# Patient Record
Sex: MALE | Race: AMERICAN INDIAN OR ALASKA NATIVE
[De-identification: names, ages, dates, MRNs, and addresses within clinical notes are randomized per-mention and may not be internally consistent; named-entity substitution may affect disease eponyms.]

---

## 2020-01-09 ENCOUNTER — HOSPITAL ENCOUNTER (OUTPATIENT)
Dept: HOSPITAL 5 - OR | Age: 65
Discharge: HOME | End: 2020-01-09
Attending: UROLOGY
Payer: OTHER GOVERNMENT

## 2020-01-09 VITALS — DIASTOLIC BLOOD PRESSURE: 78 MMHG | SYSTOLIC BLOOD PRESSURE: 118 MMHG

## 2020-01-09 DIAGNOSIS — Z79.899: ICD-10-CM

## 2020-01-09 DIAGNOSIS — Z98.890: ICD-10-CM

## 2020-01-09 DIAGNOSIS — N20.0: Primary | ICD-10-CM

## 2020-01-09 DIAGNOSIS — Z72.89: ICD-10-CM

## 2020-01-09 DIAGNOSIS — Z88.8: ICD-10-CM

## 2020-01-09 DIAGNOSIS — G47.30: ICD-10-CM

## 2020-01-09 DIAGNOSIS — Z87.891: ICD-10-CM

## 2020-01-09 DIAGNOSIS — I10: ICD-10-CM

## 2020-01-09 PROCEDURE — 82962 GLUCOSE BLOOD TEST: CPT

## 2020-01-09 PROCEDURE — 50590 FRAGMENTING OF KIDNEY STONE: CPT

## 2020-01-09 RX ADMIN — MIDAZOLAM NR MG: 1 INJECTION INTRAMUSCULAR; INTRAVENOUS at 07:30

## 2020-01-09 RX ADMIN — SODIUM CHLORIDE, SODIUM LACTATE, POTASSIUM CHLORIDE, AND CALCIUM CHLORIDE SCH MLS/HR: .6; .31; .03; .02 INJECTION, SOLUTION INTRAVENOUS at 07:29

## 2020-01-09 NOTE — OPERATIVE REPORT
PREOPERATIVE DIAGNOSES:  Left renal stones.



POSTOPERATIVE DIAGNOSES:  Left renal stones.



PROCEDURE:  In situ lithotripsy.



SURGEON:  Dr. Ford.



ANESTHESIA:  General.



FINDINGS:  This is a gentleman with a prominent stone, left kidney, intermittent

discomfort.  He now presents for left lithotripsy.  All the risks and

implications have been discussed.



DESCRIPTION OF PROCEDURE:  The patient was brought to lithotripsy unit and

placed on the operating table.  Following induction of anesthesia, the stone was

easily localized in both the AP and oblique image.  Shocks were begun at 1 kV,

increased to a maximum of 8 kV.  The stone did spread out.  There was already a

fragment to start with and this was an oblique stone.  The patient tolerated the

procedure well.  He needs to followup.  I explained this to his family.  His

wife was here and he brought to recovery in stable condition.





DD: 01/09/2020 08:41

DT: 01/09/2020 08:45

JOB# [id number]  [id number]

ALEXANDR/OPAL

## 2020-01-09 NOTE — POST OPERATIVE NOTE
Date of procedure: 01/09/20


Pre-op diagnosis: L renal stones


Post-op diagnosis: same


Findings: 





as above 


Procedure: 





L eswl 


Anesthesia: ELIO


Surgeon: SEMAJ DELUCA


Estimated blood loss: none


Pathology: none


Condition: stable


Disposition: PACU

## 2020-01-09 NOTE — DISCHARGE SUMMARY
Short Stay Discharge Plan


Activity: other (no straining )


Weight Bearing Status: Full Weight Bearing


Diet: low fat, low cholesterol, low salt


Special Instructions: other (inc fluids )


Follow up with: 


[facility] [Primary Care Provider] - 7 Days


GIBSON FERRELL MD [Staff Physician] - 14 Days

## 2020-01-09 NOTE — ANESTHESIA CONSULTATION
Anesthesia Consult and Med Hx


Date of service: 01/09/20





- Airway


Anesthetic Teeth Evaluation: Good


ROM Head & Neck: Adequate


Mental/Hyoid Distance: Adequate


Mallampati Class: Class II


Intubation Access Assessment: Good





- Pulmonary Exam


CTA: Yes





- Cardiac Exam


Cardiac Exam: RRR





- Pre-Operative Health Status


ASA Pre-Surgery Classification: ASA2


Proposed Anesthetic Plan: General





- Pulmonary


Hx Smoking: Yes (Former)


Hx Sleep Apnea: Yes (No CPAP)





- Cardiovascular System


Hx Hypertension: Yes (x 7 yrs)





- Central Nervous System


Hx Psychiatric Problems: No





- Other Systems


Hx Alcohol Use: Yes (Occas)


Hx Cancer: No